# Patient Record
Sex: MALE | Race: WHITE | Employment: UNEMPLOYED | URBAN - METROPOLITAN AREA
[De-identification: names, ages, dates, MRNs, and addresses within clinical notes are randomized per-mention and may not be internally consistent; named-entity substitution may affect disease eponyms.]

---

## 2019-03-18 ENCOUNTER — TRANSFERRED RECORDS (OUTPATIENT)
Dept: HEALTH INFORMATION MANAGEMENT | Facility: CLINIC | Age: 29
End: 2019-03-18

## 2019-03-20 ENCOUNTER — TRANSFERRED RECORDS (OUTPATIENT)
Dept: HEALTH INFORMATION MANAGEMENT | Facility: CLINIC | Age: 29
End: 2019-03-20

## 2019-03-30 ENCOUNTER — HOSPITAL ENCOUNTER (INPATIENT)
Facility: HOSPITAL | Age: 29
LOS: 2 days | Discharge: HOME | End: 2019-04-01
Attending: PSYCHIATRY & NEUROLOGY | Admitting: PSYCHIATRY & NEUROLOGY
Payer: COMMERCIAL

## 2019-03-30 ENCOUNTER — TRANSFERRED RECORDS (OUTPATIENT)
Dept: HEALTH INFORMATION MANAGEMENT | Facility: CLINIC | Age: 29
End: 2019-03-30

## 2019-03-30 PROBLEM — R45.89 SUICIDAL BEHAVIOR: Status: ACTIVE | Noted: 2019-03-30

## 2019-03-30 PROCEDURE — 25000132 ZZH RX MED GY IP 250 OP 250 PS 637: Performed by: NURSE PRACTITIONER

## 2019-03-30 PROCEDURE — 12400000 ZZH R&B MH

## 2019-03-30 RX ORDER — TRAZODONE HYDROCHLORIDE 50 MG/1
50 TABLET, FILM COATED ORAL
Status: DISCONTINUED | OUTPATIENT
Start: 2019-03-30 | End: 2019-04-02 | Stop reason: HOSPADM

## 2019-03-30 RX ORDER — ACETAMINOPHEN 325 MG/1
650 TABLET ORAL EVERY 4 HOURS PRN
Status: DISCONTINUED | OUTPATIENT
Start: 2019-03-30 | End: 2019-04-02 | Stop reason: HOSPADM

## 2019-03-30 RX ORDER — OLANZAPINE 10 MG/1
10 TABLET ORAL 3 TIMES DAILY PRN
Status: DISCONTINUED | OUTPATIENT
Start: 2019-03-30 | End: 2019-04-02 | Stop reason: HOSPADM

## 2019-03-30 RX ORDER — VENLAFAXINE HYDROCHLORIDE 75 MG/1
75 TABLET, EXTENDED RELEASE ORAL DAILY
Status: ON HOLD | COMMUNITY
End: 2019-04-01

## 2019-03-30 RX ORDER — CLONAZEPAM 0.5 MG/1
0.5 TABLET ORAL 2 TIMES DAILY PRN
Status: ON HOLD | COMMUNITY
End: 2019-04-01

## 2019-03-30 RX ORDER — ALUMINA, MAGNESIA, AND SIMETHICONE 2400; 2400; 240 MG/30ML; MG/30ML; MG/30ML
30 SUSPENSION ORAL EVERY 4 HOURS PRN
Status: DISCONTINUED | OUTPATIENT
Start: 2019-03-30 | End: 2019-04-02 | Stop reason: HOSPADM

## 2019-03-30 RX ORDER — BUPROPION HYDROCHLORIDE 150 MG/1
150 TABLET ORAL EVERY MORNING
COMMUNITY

## 2019-03-30 RX ORDER — PROPRANOLOL HYDROCHLORIDE 20 MG/1
20 TABLET ORAL 2 TIMES DAILY
COMMUNITY

## 2019-03-30 RX ORDER — DULOXETIN HYDROCHLORIDE 30 MG/1
30 CAPSULE, DELAYED RELEASE ORAL DAILY
COMMUNITY

## 2019-03-30 RX ORDER — PRAMIPEXOLE DIHYDROCHLORIDE 0.12 MG/1
0.12 TABLET ORAL AT BEDTIME
COMMUNITY

## 2019-03-30 RX ORDER — OLANZAPINE 10 MG/2ML
10 INJECTION, POWDER, FOR SOLUTION INTRAMUSCULAR 3 TIMES DAILY PRN
Status: DISCONTINUED | OUTPATIENT
Start: 2019-03-30 | End: 2019-04-02 | Stop reason: HOSPADM

## 2019-03-30 RX ORDER — PROCHLORPERAZINE MALEATE 10 MG
10 TABLET ORAL 4 TIMES DAILY PRN
COMMUNITY

## 2019-03-30 RX ORDER — SUMATRIPTAN 100 MG/1
100 TABLET, FILM COATED ORAL
COMMUNITY

## 2019-03-30 RX ORDER — HYDROXYZINE HYDROCHLORIDE 25 MG/1
25-50 TABLET, FILM COATED ORAL EVERY 4 HOURS PRN
Status: DISCONTINUED | OUTPATIENT
Start: 2019-03-30 | End: 2019-04-02 | Stop reason: HOSPADM

## 2019-03-30 RX ADMIN — TRAZODONE HYDROCHLORIDE 50 MG: 50 TABLET ORAL at 22:14

## 2019-03-30 ASSESSMENT — ACTIVITIES OF DAILY LIVING (ADL)
TOILETING: 0-->INDEPENDENT
DRESS: 0-->INDEPENDENT
BATHING: 0-->INDEPENDENT
COGNITION: 0 - NO COGNITION ISSUES REPORTED
RETIRED_COMMUNICATION: 0-->UNDERSTANDS/COMMUNICATES WITHOUT DIFFICULTY
FALL_HISTORY_WITHIN_LAST_SIX_MONTHS: NO
AMBULATION: 0-->INDEPENDENT
RETIRED_EATING: 0-->INDEPENDENT
TRANSFERRING: 0-->INDEPENDENT
SWALLOWING: 0-->SWALLOWS FOODS/LIQUIDS WITHOUT DIFFICULTY

## 2019-03-30 ASSESSMENT — MIFFLIN-ST. JEOR: SCORE: 1994.58

## 2019-03-31 PROCEDURE — 12400000 ZZH R&B MH

## 2019-03-31 PROCEDURE — 25000132 ZZH RX MED GY IP 250 OP 250 PS 637: Performed by: NURSE PRACTITIONER

## 2019-03-31 PROCEDURE — 99223 1ST HOSP IP/OBS HIGH 75: CPT | Performed by: NURSE PRACTITIONER

## 2019-03-31 RX ORDER — BUPROPION HYDROCHLORIDE 150 MG/1
150 TABLET ORAL EVERY MORNING
Status: DISCONTINUED | OUTPATIENT
Start: 2019-03-31 | End: 2019-04-02 | Stop reason: HOSPADM

## 2019-03-31 RX ORDER — PROPRANOLOL HYDROCHLORIDE 20 MG/1
20 TABLET ORAL 2 TIMES DAILY
Status: DISCONTINUED | OUTPATIENT
Start: 2019-03-31 | End: 2019-04-02 | Stop reason: HOSPADM

## 2019-03-31 RX ORDER — SUMATRIPTAN 25 MG/1
100 TABLET, FILM COATED ORAL
Status: DISCONTINUED | OUTPATIENT
Start: 2019-03-31 | End: 2019-04-02 | Stop reason: HOSPADM

## 2019-03-31 RX ORDER — PROCHLORPERAZINE MALEATE 10 MG
10 TABLET ORAL 4 TIMES DAILY PRN
Status: DISCONTINUED | OUTPATIENT
Start: 2019-03-31 | End: 2019-04-02 | Stop reason: HOSPADM

## 2019-03-31 RX ORDER — DULOXETIN HYDROCHLORIDE 30 MG/1
30 CAPSULE, DELAYED RELEASE ORAL DAILY
Status: DISCONTINUED | OUTPATIENT
Start: 2019-03-31 | End: 2019-04-02 | Stop reason: HOSPADM

## 2019-03-31 RX ORDER — CLONAZEPAM 0.5 MG/1
0.5 TABLET ORAL 2 TIMES DAILY PRN
Status: DISCONTINUED | OUTPATIENT
Start: 2019-03-31 | End: 2019-04-02 | Stop reason: HOSPADM

## 2019-03-31 RX ORDER — PRAMIPEXOLE DIHYDROCHLORIDE 0.12 MG/1
0.12 TABLET ORAL AT BEDTIME
Status: DISCONTINUED | OUTPATIENT
Start: 2019-03-31 | End: 2019-04-02 | Stop reason: HOSPADM

## 2019-03-31 RX ADMIN — PROPRANOLOL HYDROCHLORIDE 20 MG: 20 TABLET ORAL at 20:27

## 2019-03-31 RX ADMIN — PRAMIPEXOLE DIHYDROCHLORIDE 0.12 MG: 0.12 TABLET ORAL at 20:27

## 2019-03-31 RX ADMIN — DULOXETINE HYDROCHLORIDE 30 MG: 30 CAPSULE, DELAYED RELEASE ORAL at 11:38

## 2019-03-31 RX ADMIN — OMEPRAZOLE 20 MG: 20 CAPSULE, DELAYED RELEASE ORAL at 11:38

## 2019-03-31 RX ADMIN — PROPRANOLOL HYDROCHLORIDE 20 MG: 20 TABLET ORAL at 11:38

## 2019-03-31 RX ADMIN — BUPROPION HYDROCHLORIDE 150 MG: 150 TABLET, FILM COATED, EXTENDED RELEASE ORAL at 11:38

## 2019-03-31 ASSESSMENT — ACTIVITIES OF DAILY LIVING (ADL)
LAUNDRY: UNABLE TO COMPLETE
HYGIENE/GROOMING: INDEPENDENT
DRESS: INDEPENDENT
ORAL_HYGIENE: INDEPENDENT

## 2019-03-31 ASSESSMENT — MIFFLIN-ST. JEOR: SCORE: 1987.78
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
## 2019-03-31 NOTE — PLAN OF CARE
Adult Behavioral Health Plan of Care  Patient-Specific Goal (Individualization)  Description  Patient will attend groups  Patient will take medications as ordered   3/31/2019 1050 - Improving by Guillermina Segovia  Pt has attended groups this shift.   Pt has taken medications as prescribed.     Pt has been calm, cooperative this shift. He is observed/overheard on the telephone very irritable. Stating that he was speaking with his ex and it is very stressful anytime that they speak. Pt denies SI, HI, and hallucinations. He denies pain. States that he slept well last night. Pt has attended groups throughout the shift with appropriate behaviors. Pt denies any suicidal behavior prior to coming in stating that someone made that up. He has taken medications as prescribed.      Depression  Improved Mood  Description  Patient will remain free from harm  Patient will express decrease in depression  Patient will voice no suicidal thoughts.   3/31/2019 1050 - Improving by Guillermina Segovia  Pt has not had any self harm.   Pt states that his depression has not changed.  Pt denies SI.

## 2019-03-31 NOTE — PLAN OF CARE
Face to face end of shift report received from Aishwarya JOHNSON. Rounding completed. Patient observed.     Khadra Arroyo  3/30/2019  11:54 PM

## 2019-03-31 NOTE — PROGRESS NOTES
03/30/19 2201   Patient Belongings   Did you bring any home meds/supplements to the hospital?  No   Patient Belongings sent to security per site process;locker   Patient Belongings Put in Hospital Secure Location (Security or Locker, etc.) cash/credit card;cell phone/electronics;keys;ring;wallet;watch   Belongings Search Yes   Clothing Search Yes   Second Staff Kay    Comment shoes, papers, jacket, blue hat, black sweatshirt, grey seatpants, green long-sleeve, underwear, 2 pairs of socks,  travel size tylenol, has ring on.    List items sent to safe: wallet, [place] drivers licenses, [facility] ID card, 3 Blue Cross Blue Shield cards, 2 EBT, Social Security card, insurance card, misc. Business cards, note, [employer] rewards card, Visa card, 2 Master cards, Andriod phone (no cracks), watch (no cracks), set of keys, 2 silver colored rings.   All other belongings put in assigned cubby in belongings room.     I have reviewed my belongings list on admission and verify that it is correct.     Patient signature_______________________________    Second staff witness (if patient unable to sign) ______________________________       I have received all my belongings at discharge.    Patient signature________________________________    Rachael   3/30/2019  10:14 PM

## 2019-03-31 NOTE — PLAN OF CARE
"  Adult Behavioral Health Plan of Care  Patient-Specific Goal (Individualization)  Description  Patient will attend groups  Patient will take medications as ordered   3/31/2019 1822 - No Change by Aishwarya Garcia RN   Attending groups and taking his medications.  Depression  Improved Mood  Description  Patient will remain free from harm  Patient will express decrease in depression  Patient will voice no suicidal thoughts.   3/31/2019 1822 - No Change by Aishwarya Garcia, RN  Denies suicidal thoughts or plan. \"The rope wasn't for hurting myself it was to pack up stuff to sell. Last time it was but not this time.. No I am not going to kill myself.\" Denied being depressed until we stated talking about his ex. \"She told me that we are not getting back together no matter what. She likes this new chloé who is a white dean just like the rest of his family. So I am fucked.\" States he can see his daughter but it will just hurt more cause he has to see the mother. Has never met his 4 year old. Crying and angry,\"she didn't even give me a change to fix things. She wants to be with this new chloé.\"   "
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
## 2019-03-31 NOTE — PLAN OF CARE
Face to face end of shift report received from Khadra ESETS RN. Rounding completed. Patient observed in [facility].     Guillermina Segovia  3/31/2019  8:13 AM

## 2019-03-31 NOTE — PLAN OF CARE
Face to face end of shift report received from Guillermina JOHNSON. Rounding completed. Patient observed. In group.     Aishwarya Garcia  3/31/2019  3:58 PM

## 2019-04-01 VITALS
BODY MASS INDEX: 30.73 KG/M2 | RESPIRATION RATE: 17 BRPM | HEART RATE: 68 BPM | HEIGHT: 71 IN | SYSTOLIC BLOOD PRESSURE: 131 MMHG | OXYGEN SATURATION: 96 % | DIASTOLIC BLOOD PRESSURE: 86 MMHG | WEIGHT: 219.5 LBS | TEMPERATURE: 98 F

## 2019-04-01 PROBLEM — F43.10 PTSD (POST-TRAUMATIC STRESS DISORDER): Status: ACTIVE | Noted: 2019-04-01

## 2019-04-01 PROBLEM — F39 MOOD DISORDER (H): Status: ACTIVE | Noted: 2019-04-01

## 2019-04-01 PROCEDURE — 12400000 ZZH R&B MH

## 2019-04-01 PROCEDURE — 25000132 ZZH RX MED GY IP 250 OP 250 PS 637: Performed by: NURSE PRACTITIONER

## 2019-04-01 PROCEDURE — 99239 HOSP IP/OBS DSCHRG MGMT >30: CPT | Performed by: NURSE PRACTITIONER

## 2019-04-01 RX ORDER — OLANZAPINE 5 MG/1
5 TABLET ORAL AT BEDTIME
Status: DISCONTINUED | OUTPATIENT
Start: 2019-04-01 | End: 2019-04-02 | Stop reason: HOSPADM

## 2019-04-01 RX ORDER — OLANZAPINE 5 MG/1
5 TABLET ORAL AT BEDTIME
COMMUNITY

## 2019-04-01 RX ADMIN — BUPROPION HYDROCHLORIDE 150 MG: 150 TABLET, FILM COATED, EXTENDED RELEASE ORAL at 08:26

## 2019-04-01 RX ADMIN — DULOXETINE HYDROCHLORIDE 30 MG: 30 CAPSULE, DELAYED RELEASE ORAL at 08:26

## 2019-04-01 RX ADMIN — PROPRANOLOL HYDROCHLORIDE 20 MG: 20 TABLET ORAL at 20:15

## 2019-04-01 RX ADMIN — PROPRANOLOL HYDROCHLORIDE 20 MG: 20 TABLET ORAL at 08:26

## 2019-04-01 RX ADMIN — OMEPRAZOLE 20 MG: 20 CAPSULE, DELAYED RELEASE ORAL at 08:26

## 2019-04-01 ASSESSMENT — ACTIVITIES OF DAILY LIVING (ADL)
HYGIENE/GROOMING: INDEPENDENT
ORAL_HYGIENE: INDEPENDENT
HYGIENE/GROOMING: INDEPENDENT
ORAL_HYGIENE: INDEPENDENT
DRESS: SCRUBS (BEHAVIORAL HEALTH);INDEPENDENT
LAUNDRY: UNABLE TO COMPLETE
DRESS: INDEPENDENT;PROMPTS
LAUNDRY: UNABLE TO COMPLETE

## 2019-04-01 NOTE — PLAN OF CARE
Adult Behavioral Health Plan of Care  Patient-Specific Goal (Individualization)  Description  Patient will attend groups  Patient will take medications as ordered   4/1/2019 0613 - No Change by Khadra Arroyo, RN   Pt appeared to be sleeping most of this shift, normal respirations and position changes noted.

## 2019-04-01 NOTE — PLAN OF CARE
Face to face end of shift report received from Aishwarya JOHNSON. Rounding completed. Patient observed.     Khadra Arroyo  3/31/2019  11:40 PM

## 2019-04-01 NOTE — PLAN OF CARE
Face to face end of shift report received from Khadra JOHNSON. Rounding completed. Patient observed.     Otilia Hutchinson  4/1/2019  7:37 AM
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
## 2019-04-01 NOTE — PLAN OF CARE
Face to face end of shift report received from ELIZABETH Bingham. Rounding completed. Patient observed.     Jodee Sprague  4/1/2019  3:45 PM
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
## 2019-04-01 NOTE — PLAN OF CARE
Pt is discharging at the recommendation of the treatment team. Pt is discharging to home transported by friend. Pt denies having any thoughts of hurting themself or anyone else. Pt denies anxiety or depression. Pt has follow up with [facility]. Discharge instructions, including; demographic sheet, psychiatric evaluation, discharge summary, and AVS were faxed to these next level of care providers.

## 2019-04-01 NOTE — PLAN OF CARE
"Pt denies SI, HI, and hallucinations. Pt has been pacing the hallway at a very fast pace. Says he will be staying here till tomorrow due to inability to find a ride. States, \"i'm just going crazy being locked up in here. I usually run 5 miles a day to help with my anxiety.\" Declines medication intervention, would just like to walk the lezama. Denies depression. No complaints of pain. Is heard several times yelling on the phone  2040: Pt refused bedtime medication. Says that he has to drive when he gets picked up at midnight and he doesn't want to be tired.   "
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
## 2019-04-01 NOTE — PLAN OF CARE
BEHAVIORAL TEAM DISCUSSION    Participants: Manisha So NP,  Anusha Slater LICSW, Sumaya Jara LSW,  Vika Soler LSW, Cristiana Richards LGSW, Milady Andersen RN,  Cheryl Lomax RN,  Alyssa Kearney RN, Melanie De [facility], Quiana Schmitt OT, Erika Arellano OT, Gloria Alegria OT   Progress: Minimal: Attended am group and  reports patient edgy and poor insight to the severity of his suicidal behavior.     Continued Stay Criteria/Rationale: Suicidal behavior, agitated.   Medical/Physical: Latex allergies.   Precautions:   Behavioral Orders   Procedures    Code 1 - Restrict to Unit    Routine Programming     As clinically indicated    Status 15     Every 15 minutes.     Plan: Monitor for target symptoms:   Provide a safe environment and therapeutic milieu.    Continue PTA medications     Rationale for change in precautions or plan: none    Current Facility-Administered Medications:     acetaminophen (TYLENOL) tablet 650 mg, 650 mg, Oral, Q4H PRN, Janis Hutson, NP    alum & mag hydroxide-simethicone (MYLANTA ES/MAALOX  ES) suspension 30 mL, 30 mL, Oral, Q4H PRN, Janis Hutson, NP    aspirin (ASA) EC tablet 325 mg, 325 mg, Oral, Daily PRN, Erika Rebolledo APRN CNP    buPROPion (WELLBUTRIN XL) 24 hr tablet 150 mg, 150 mg, Oral, QAM, WoelpernErika APRADOLFO CNP, 150 mg at 04/01/19 0826    clonazePAM (klonoPIN) tablet 0.5 mg, 0.5 mg, Oral, BID PRN, Erika Rebolledo APRN CNP    DULoxetine (CYMBALTA) EC capsule 30 mg, 30 mg, Oral, Daily, WoelpernErika APRN CNP, 30 mg at 04/01/19 0826    hydrOXYzine (ATARAX) tablet 25-50 mg, 25-50 mg, Oral, Q4H PRN, Janis Hutson, NP    magnesium hydroxide (MILK OF MAGNESIA) suspension 30 mL, 30 mL, Oral, At Bedtime PRN, Janis Hutson, NP    nicotine (NICORETTE) gum 2-4 mg, 2-4 mg, Buccal, Q1H PRN, Janis Hutson NP    OLANZapine (zyPREXA) tablet 10 mg, 10 mg, Oral, TID PRN **OR** OLANZapine (zyPREXA) injection 10 mg, 10 mg,  Intramuscular, TID PRN, Janis Hutson NP    omeprazole (priLOSEC) CR capsule 20 mg, 20 mg, Oral, Daily, Erika Rebolledo APRN CNP, 20 mg at 04/01/19 0826    pramipexole (MIRAPEX) tablet 0.125 mg, 0.125 mg, Oral, At Bedtime, Erika Rebolledo APRN CNP, 0.125 mg at 03/31/19 2027    prochlorperazine (COMPAZINE) tablet 10 mg, 10 mg, Oral, 4x Daily PRN, Erika Rebolledo APRN CNP    propranolol (INDERAL) tablet 20 mg, 20 mg, Oral, BID, Erika Rebolledo APRN CNP, 20 mg at 04/01/19 0826    SUMAtriptan (IMITREX) tablet 100 mg, 100 mg, Oral, at onset of headache, Erika Rebolledo APRN CNP    traZODone (DESYREL) tablet 50 mg, 50 mg, Oral, At Bedtime PRN, Janis Hutson NP, 50 mg at 03/30/19 2214   Patient Active Problem List   Diagnosis    Suicidal behavior

## 2019-04-01 NOTE — PLAN OF CARE
Adult Behavioral Health Plan of Care  Patient-Specific Goal (Individualization)  Description  Patient will attend groups  Patient will take medications as ordered   4/1/2019 1345 - Improving by Otilia Hutchinson RN     Adult Behavioral Health Plan of Care  Adheres to Safety Considerations for Self and Others  4/1/2019 1345 - Improving by Otilia Hutchinson RN     Adult Behavioral Health Plan of Care  Optimized Coping Skills in Response to Life Stressors  4/1/2019 1345 - Improving by Otilia Hutchinson RN     Adult Behavioral Health Plan of Care  Develops/Participates in Therapeutic [place] to Support Successful Transition  4/1/2019 1345 - Improving by Otilia Hutchinson RN     Depression  Improved Mood  Description  Patient will remain free from harm  Patient will express decrease in depression  Patient will voice no suicidal thoughts.   4/1/2019 1345 - Improving by Otilia Hucthinson RN  Patient denies SI, HI, hallucinations, pain, anxiety, depression. Patient denies all criteria this AM. Patient does not offer more than is asked of him. Medications are gone over with patient and he is aware of medications, he understands what he is taking, he states he sets up his medications at home. Patient insists that he was not trying to hurt himself with the rope that he had in his possession. Patient keeps to himself while here, he is cooperative and follows direction.
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
## 2019-04-02 NOTE — PLAN OF CARE
Discharge Note    Patient Discharged to home on 4/1/2019 11:42 PM via Private Car accompanied by 2  staff and friend.     Patient informed of discharge instructions in AVS. patient verbalizes understanding and denies having any questions pertaining to AVS. Patient stable at time of discharge. Patient denies SI, HI, and thoughts of self harm at time of discharge. All personal belongings returned to patient. No discharge prescriptions sent as pt reported having enough medications at home and no medication changes were made, per Manisha So NP notes.     PAULY MARTEL  4/1/2019  11:59 PM